# Patient Record
Sex: MALE | Race: BLACK OR AFRICAN AMERICAN
[De-identification: names, ages, dates, MRNs, and addresses within clinical notes are randomized per-mention and may not be internally consistent; named-entity substitution may affect disease eponyms.]

---

## 2017-09-14 ENCOUNTER — PRIOR ORIGINAL RECORDS (OUTPATIENT)
Dept: OTHER | Age: 82
End: 2017-09-14

## 2018-05-25 ENCOUNTER — PRIOR ORIGINAL RECORDS (OUTPATIENT)
Dept: OTHER | Age: 83
End: 2018-05-25

## 2018-10-18 ENCOUNTER — PRIOR ORIGINAL RECORDS (OUTPATIENT)
Dept: OTHER | Age: 83
End: 2018-10-18

## 2018-10-18 ENCOUNTER — MYAURORA ACCOUNT LINK (OUTPATIENT)
Dept: OTHER | Age: 83
End: 2018-10-18

## 2018-10-18 LAB
CHOLESTEROL, TOTAL: 134 MG/DL
HDL CHOLESTEROL: 57 MG/DL
LDL CHOLESTEROL: 68 MG/DL
TRIGLYCERIDES: 44 MG/DL

## 2019-01-01 ENCOUNTER — EXTERNAL RECORD (OUTPATIENT)
Dept: HEALTH INFORMATION MANAGEMENT | Facility: OTHER | Age: 84
End: 2019-01-01

## 2019-02-28 VITALS
WEIGHT: 177 LBS | DIASTOLIC BLOOD PRESSURE: 60 MMHG | HEART RATE: 64 BPM | HEIGHT: 72 IN | BODY MASS INDEX: 23.98 KG/M2 | SYSTOLIC BLOOD PRESSURE: 120 MMHG

## 2019-02-28 VITALS
DIASTOLIC BLOOD PRESSURE: 60 MMHG | OXYGEN SATURATION: 99 % | WEIGHT: 180 LBS | SYSTOLIC BLOOD PRESSURE: 128 MMHG | HEART RATE: 74 BPM | HEIGHT: 72 IN | BODY MASS INDEX: 24.38 KG/M2

## 2019-03-22 RX ORDER — LISINOPRIL 20 MG/1
TABLET ORAL
COMMUNITY

## 2019-03-22 RX ORDER — FENOFIBRATE 54 MG/1
TABLET ORAL
COMMUNITY

## 2019-03-22 RX ORDER — ATENOLOL 25 MG/1
TABLET ORAL
COMMUNITY

## 2019-03-22 RX ORDER — FINASTERIDE 5 MG/1
TABLET, FILM COATED ORAL
COMMUNITY

## 2019-03-22 RX ORDER — ALBUTEROL SULFATE 90 UG/1
AEROSOL, METERED RESPIRATORY (INHALATION)
COMMUNITY

## 2019-03-22 RX ORDER — SIMVASTATIN 20 MG
TABLET ORAL
COMMUNITY

## 2019-03-22 RX ORDER — FERROUS SULFATE 325(65) MG
TABLET ORAL
COMMUNITY

## 2019-03-22 RX ORDER — TAMSULOSIN HYDROCHLORIDE 0.4 MG/1
CAPSULE ORAL
COMMUNITY

## 2019-05-02 ENCOUNTER — OFFICE VISIT (OUTPATIENT)
Dept: CARDIOLOGY | Age: 84
End: 2019-05-02

## 2019-05-02 VITALS
BODY MASS INDEX: 23.86 KG/M2 | DIASTOLIC BLOOD PRESSURE: 60 MMHG | WEIGHT: 180 LBS | HEIGHT: 73 IN | SYSTOLIC BLOOD PRESSURE: 130 MMHG | HEART RATE: 73 BPM | OXYGEN SATURATION: 98 %

## 2019-05-02 DIAGNOSIS — E78.5 DYSLIPIDEMIA: ICD-10-CM

## 2019-05-02 DIAGNOSIS — J43.8 OTHER EMPHYSEMA (CMD): Primary | ICD-10-CM

## 2019-05-02 DIAGNOSIS — I10 ESSENTIAL HYPERTENSION: ICD-10-CM

## 2019-05-02 DIAGNOSIS — E11.9 TYPE 2 DIABETES MELLITUS WITHOUT COMPLICATION, WITHOUT LONG-TERM CURRENT USE OF INSULIN (CMD): ICD-10-CM

## 2019-05-02 DIAGNOSIS — R07.89 OTHER CHEST PAIN: ICD-10-CM

## 2019-05-02 PROBLEM — J44.9 COPD (CHRONIC OBSTRUCTIVE PULMONARY DISEASE) (CMD): Status: ACTIVE | Noted: 2019-05-02

## 2019-05-02 PROCEDURE — 99214 OFFICE O/P EST MOD 30 MIN: CPT | Performed by: INTERNAL MEDICINE

## 2019-05-02 PROCEDURE — 3078F DIAST BP <80 MM HG: CPT | Performed by: INTERNAL MEDICINE

## 2019-05-02 PROCEDURE — 3075F SYST BP GE 130 - 139MM HG: CPT | Performed by: INTERNAL MEDICINE

## 2019-05-02 RX ORDER — PANTOPRAZOLE SODIUM 40 MG/1
40 TABLET, DELAYED RELEASE ORAL
COMMUNITY
Start: 2015-08-27

## 2019-05-23 ENCOUNTER — ANCILLARY PROCEDURE (OUTPATIENT)
Dept: CARDIOLOGY | Age: 84
End: 2019-05-23
Attending: INTERNAL MEDICINE

## 2019-05-23 VITALS — HEIGHT: 73 IN | BODY MASS INDEX: 23.19 KG/M2 | WEIGHT: 175 LBS

## 2019-05-23 DIAGNOSIS — R07.89 OTHER CHEST PAIN: ICD-10-CM

## 2019-05-23 LAB
LV EF: 68 %
RESTING HR ACHIEVED: 60 BPM
STRESS BASELINE BP: NORMAL MMHG
STRESS POST EXERCISE DUR MIN: 1 MIN
STRESS POST EXERCISE DUR SEC: 2 SEC
STRESS TARGET HR: 136 BPM

## 2019-05-23 PROCEDURE — 93015 CV STRESS TEST SUPVJ I&R: CPT | Performed by: INTERNAL MEDICINE

## 2019-05-23 PROCEDURE — 78452 HT MUSCLE IMAGE SPECT MULT: CPT | Performed by: INTERNAL MEDICINE

## 2019-05-23 PROCEDURE — A9502 TC99M TETROFOSMIN: HCPCS | Performed by: INTERNAL MEDICINE

## 2019-05-23 RX ORDER — REGADENOSON 0.08 MG/ML
0.4 INJECTION, SOLUTION INTRAVENOUS ONCE
Status: COMPLETED | OUTPATIENT
Start: 2019-05-23 | End: 2019-05-23

## 2019-05-23 RX ADMIN — REGADENOSON 0.4 MG: 0.08 INJECTION, SOLUTION INTRAVENOUS at 11:30

## 2019-05-23 ASSESSMENT — EXERCISE STRESS TEST
COMMENTS: IMMEDIATE RECOVERY
PEAK_RPP: 8024
STAGE_CATEGORIES: 1
PEAK_HR: 93
PEAK_BP: 140/70
PEAK_RPP: 7560
PEAK_HR: 85
PEAK_RPP: 10710
PEAK_HR: 103
PEAK_RPP: 13390
PEAK_BP: 130/72
STAGE_CATEGORIES: RECOVERY 2
STOPPAGE_REASON: PROTOCOL COMPLETE
PEAK_RPP: 13020
PEAK_BP: 126/66
PEAK_BP: 126/62
PEAK_HR: 68
PEAK_BP: 118/70
STAGE_CATEGORIES: RESTING
COMMENTS: 2 MIN RECOVERY
STAGE_CATEGORIES: RECOVERY 1
STAGE_CATEGORIES: RECOVERY 0
PEAK_HR: 60

## 2019-11-06 ENCOUNTER — OFFICE VISIT (OUTPATIENT)
Dept: CARDIOLOGY | Age: 84
End: 2019-11-06

## 2019-11-06 VITALS
WEIGHT: 183 LBS | HEART RATE: 78 BPM | SYSTOLIC BLOOD PRESSURE: 138 MMHG | HEIGHT: 74 IN | BODY MASS INDEX: 23.49 KG/M2 | OXYGEN SATURATION: 99 % | DIASTOLIC BLOOD PRESSURE: 60 MMHG

## 2019-11-06 DIAGNOSIS — R07.89 OTHER CHEST PAIN: Primary | ICD-10-CM

## 2019-11-06 PROCEDURE — 3078F DIAST BP <80 MM HG: CPT | Performed by: INTERNAL MEDICINE

## 2019-11-06 PROCEDURE — 99214 OFFICE O/P EST MOD 30 MIN: CPT | Performed by: INTERNAL MEDICINE

## 2019-11-06 PROCEDURE — 3075F SYST BP GE 130 - 139MM HG: CPT | Performed by: INTERNAL MEDICINE

## 2019-11-06 ASSESSMENT — PATIENT HEALTH QUESTIONNAIRE - PHQ9
SUM OF ALL RESPONSES TO PHQ9 QUESTIONS 1 AND 2: 0
SUM OF ALL RESPONSES TO PHQ9 QUESTIONS 1 AND 2: 0
2. FEELING DOWN, DEPRESSED OR HOPELESS: NOT AT ALL
1. LITTLE INTEREST OR PLEASURE IN DOING THINGS: NOT AT ALL

## 2020-10-28 ENCOUNTER — HOSPITAL ENCOUNTER (OUTPATIENT)
Dept: CT IMAGING | Facility: HOSPITAL | Age: 85
Discharge: HOME | End: 2020-10-28
Attending: UROLOGY
Payer: MEDICARE

## 2020-10-28 DIAGNOSIS — N28.89 RENAL MASS: ICD-10-CM

## 2020-10-28 DIAGNOSIS — N28.1 SIMPLE RENAL CYST: ICD-10-CM

## 2020-10-28 PROCEDURE — 74176 CT ABD & PELVIS W/O CONTRAST: CPT | Performed by: UROLOGY

## 2020-10-30 ENCOUNTER — APPOINTMENT (OUTPATIENT)
Dept: GENERAL RADIOLOGY | Facility: HOSPITAL | Age: 85
End: 2020-10-30
Attending: EMERGENCY MEDICINE
Payer: MEDICARE

## 2020-10-30 ENCOUNTER — HOSPITAL ENCOUNTER (EMERGENCY)
Facility: HOSPITAL | Age: 85
Discharge: HOME | End: 2020-10-30
Attending: EMERGENCY MEDICINE
Payer: MEDICARE

## 2020-10-30 VITALS
HEIGHT: 62 IN | BODY MASS INDEX: 29.44 KG/M2 | RESPIRATION RATE: 20 BRPM | WEIGHT: 160 LBS | SYSTOLIC BLOOD PRESSURE: 117 MMHG | HEART RATE: 88 BPM | TEMPERATURE: 99 F | OXYGEN SATURATION: 100 % | DIASTOLIC BLOOD PRESSURE: 58 MMHG

## 2020-10-30 DIAGNOSIS — U07.1 COVID-19 VIRUS INFECTION: Primary | ICD-10-CM

## 2020-10-30 DIAGNOSIS — R63.0 DECREASED APPETITE: ICD-10-CM

## 2020-10-30 PROCEDURE — 96374 THER/PROPH/DIAG INJ IV PUSH: CPT

## 2020-10-30 PROCEDURE — 96361 HYDRATE IV INFUSION ADD-ON: CPT

## 2020-10-30 PROCEDURE — 82962 GLUCOSE BLOOD TEST: CPT

## 2020-10-30 PROCEDURE — 99284 EMERGENCY DEPT VISIT MOD MDM: CPT

## 2020-10-30 PROCEDURE — 71045 X-RAY EXAM CHEST 1 VIEW: CPT | Performed by: EMERGENCY MEDICINE

## 2020-10-30 RX ORDER — ONDANSETRON 4 MG/1
4 TABLET, ORALLY DISINTEGRATING ORAL EVERY 8 HOURS PRN
Qty: 30 TABLET | Refills: 0 | Status: SHIPPED | OUTPATIENT
Start: 2020-10-30 | End: 2020-11-06

## 2020-10-30 RX ORDER — ONDANSETRON 2 MG/ML
4 INJECTION INTRAMUSCULAR; INTRAVENOUS ONCE
Status: COMPLETED | OUTPATIENT
Start: 2020-10-30 | End: 2020-10-30

## 2020-10-30 NOTE — ED INITIAL ASSESSMENT (HPI)
Pt presents to ER for symptoms of COVID as pt reported: going on for couple of weeks. + fever,chills. + cough /SOB  + decrease appetite. Per pt his son was tested Positive for COVID Monday. Pt is A/O x 4,breathing is unlabored.   Oxygen saturation at 98

## 2020-10-30 NOTE — ED PROVIDER NOTES
Patient Seen in: [facility] Emergency Department      History   Patient presents with:  Cough/URI  Testing    Stated Complaint: Testing     HPI    80year old male with gerd, dm, htn who presents with cough, malaise, fever/chills, sob, dec appetit Conjunctiva/sclera: Conjunctivae normal.      Pupils: Pupils are equal, round, and reactive to light. Neck:      Musculoskeletal: Full passive range of motion without pain, normal range of motion and neck supple.  No neck rigidity, spinous process tendern Intravenous Given 10/30/20 1511)            Disposition and Plan     Clinical Impression:  [id number] virus infection  (primary encounter diagnosis)  Decreased appetite    Disposition:  Discharge  10/30/2020  4:15 pm    Follow-up:  Osiel Mason  [address] [place] [address]

## 2020-11-02 ENCOUNTER — HOSPITAL ENCOUNTER (EMERGENCY)
Facility: HOSPITAL | Age: 85
Discharge: HOME | End: 2020-11-02
Attending: EMERGENCY MEDICINE
Payer: MEDICARE

## 2020-11-02 ENCOUNTER — APPOINTMENT (OUTPATIENT)
Dept: GENERAL RADIOLOGY | Facility: HOSPITAL | Age: 85
End: 2020-11-02
Attending: EMERGENCY MEDICINE
Payer: MEDICARE

## 2020-11-02 VITALS
BODY MASS INDEX: 30 KG/M2 | HEART RATE: 98 BPM | WEIGHT: 165 LBS | TEMPERATURE: 99 F | OXYGEN SATURATION: 98 % | SYSTOLIC BLOOD PRESSURE: 132 MMHG | RESPIRATION RATE: 16 BRPM | DIASTOLIC BLOOD PRESSURE: 76 MMHG

## 2020-11-02 DIAGNOSIS — U07.1 COVID-19: Primary | ICD-10-CM

## 2020-11-02 DIAGNOSIS — R63.0 ANOREXIA: ICD-10-CM

## 2020-11-02 PROCEDURE — 93010 ELECTROCARDIOGRAM REPORT: CPT | Performed by: EMERGENCY MEDICINE

## 2020-11-02 PROCEDURE — 36415 COLL VENOUS BLD VENIPUNCTURE: CPT

## 2020-11-02 PROCEDURE — 99284 EMERGENCY DEPT VISIT MOD MDM: CPT

## 2020-11-02 PROCEDURE — 71045 X-RAY EXAM CHEST 1 VIEW: CPT | Performed by: EMERGENCY MEDICINE

## 2020-11-02 PROCEDURE — 93005 ELECTROCARDIOGRAM TRACING: CPT

## 2020-11-02 PROCEDURE — 80048 BASIC METABOLIC PNL TOTAL CA: CPT | Performed by: EMERGENCY MEDICINE

## 2020-11-02 PROCEDURE — 85025 COMPLETE CBC W/AUTO DIFF WBC: CPT | Performed by: EMERGENCY MEDICINE

## 2020-11-02 NOTE — ED INITIAL ASSESSMENT (HPI)
Pt presents to ED with worsening COVID symptoms. Unable to specify what is going on today. Pt is alert, oriented and verbal. Whole family is COVID+. Pt diagnosed with COVID here 10/30. Pt denies use of oxygen at home. Denies SOB at this time.  Denies n/v. +

## 2020-11-03 NOTE — CM/SW NOTE
Order for [facility] [address] [facility]     Patient agreeable, called patients home to speak with wife but no answer    Will set up [facility] [address] and notify patient and wife in am

## 2020-11-03 NOTE — ED PROVIDER NOTES
Patient Seen in: [facility] Emergency Department      History   Patient presents with:  Covid    Stated Complaint: COVID+    HPI    59-year-old male with recent COVID diagnosis presents for evaluation.   Patient's family reports patient has had epi regular rhythm. Heart sounds: Normal heart sounds. Pulmonary:      Effort: Pulmonary effort is normal. No respiratory distress. Breath sounds: Normal breath sounds.    Abdominal:      General: Bowel sounds are normal.      Palpations: Abdomen is Sandrine Bueno MD on 11/02/2020 at 4:14 PM                  MDM      Well-appearing patient, labs are within normal limits, patient ambulatory with pulse ox 97 to 98%.   Given pulse ox and incentive spirometer for home, seen by case management to facilitate home

## 2020-11-03 NOTE — CM/SW NOTE
Goyo Antunez from [facility] [place] called stating they are unable to take patient has he has [employer] which they do not accept. Per Goyo Antunez they will re-refer patient and Goyo Antunez will call patient and inform him of the above.  Emmie informed to please p

## 2020-11-03 NOTE — ED NOTES
Dr Perez discussed d/c information with patient and pts daughter.      Case management at bedside and went over pulse oximeter and incentive spirometer use as well as home health    Pt stable and ready for d/c

## 2020-11-03 NOTE — HOME CARE LIAISON
[facility] (formerly known [facility])  is available to take pt. Attempted to call pt at home multiple times and unable to reach or leave  for the pt. CM notified.

## 2020-11-03 NOTE — HOME CARE LIAISON
Received referral from [address] [place] [address]. Unable to accept pt at this time. Pt re-referred. JOSEPHINE Stephens/ Shira updated.

## 2020-11-03 NOTE — ED NOTES
Pt ambulated in room with pulse ox by ERT Maisha    No distress noted.  Pt independently transferring    Pt is speaking in full sentences

## 2020-11-04 NOTE — CM/SW NOTE
Attempted to call patient at home to notify him if [facility] acceptance    No answer, unable to leave message

## 2020-11-05 NOTE — CM/SW NOTE
Rc'd secure message from Shahida Shankar with [facility] they were having problems reaching patient - per [place] line would ring and ring and then does off the hook type of sound.  Sent Emmie message back to please use pt's wife Ashley Lugo yonny

## 2020-11-05 NOTE — CM/SW NOTE
[address] @ [phone] and spoke with Graham Leventhal. Informed Graham Leventhal of wife's contact number to use to reach patient. Graham Leventhal requesting Face to Face, ER MD note and face sheet to be faxed to her.  Faxed 14 pages of the above to Graham Leventhal @ [phone] - qjx

## 2020-11-20 ENCOUNTER — HOSPITAL ENCOUNTER (EMERGENCY)
Facility: HOSPITAL | Age: 85
Discharge: HOME | End: 2020-11-20
Attending: EMERGENCY MEDICINE
Payer: MEDICARE

## 2020-11-20 VITALS
DIASTOLIC BLOOD PRESSURE: 79 MMHG | RESPIRATION RATE: 18 BRPM | OXYGEN SATURATION: 100 % | TEMPERATURE: 99 F | HEART RATE: 87 BPM | SYSTOLIC BLOOD PRESSURE: 158 MMHG

## 2020-11-20 DIAGNOSIS — Z20.822 ENCOUNTER FOR LABORATORY TESTING FOR COVID-19 VIRUS: Primary | ICD-10-CM

## 2020-11-20 PROCEDURE — 99283 EMERGENCY DEPT VISIT LOW MDM: CPT

## 2020-11-20 NOTE — ED INITIAL ASSESSMENT (HPI)
Patient aox3 to ed via private vehicle patient tested positive for covid19 x 3 weeks ago, requesitng another test to see if he is negative.  Patient has no complaints

## 2020-11-20 NOTE — ED PROVIDER NOTES
Patient Seen in: [facility] Emergency Department      History   Patient presents with:  Testing    Stated Complaint: testing     HPI  Patient is an 49-year-old male history of diabetes, hypertension, reflux presenting from home for repeat Covid te Pulmonary:      Effort: Pulmonary effort is normal. No respiratory distress. Breath sounds: Normal breath sounds. Abdominal:      General: There is no distension. Tenderness: There is no abdominal tenderness. There is no guarding or rebound.

## 2021-12-26 ENCOUNTER — APPOINTMENT (OUTPATIENT)
Dept: GENERAL RADIOLOGY | Facility: HOSPITAL | Age: 86
End: 2021-12-26
Attending: NURSE PRACTITIONER
Payer: MEDICARE

## 2021-12-26 ENCOUNTER — HOSPITAL ENCOUNTER (EMERGENCY)
Facility: HOSPITAL | Age: 86
Discharge: HOME | End: 2021-12-26
Payer: MEDICARE

## 2021-12-26 VITALS
BODY MASS INDEX: 30 KG/M2 | HEART RATE: 88 BPM | RESPIRATION RATE: 18 BRPM | TEMPERATURE: 98 F | SYSTOLIC BLOOD PRESSURE: 140 MMHG | DIASTOLIC BLOOD PRESSURE: 79 MMHG | WEIGHT: 164 LBS | OXYGEN SATURATION: 98 %

## 2021-12-26 DIAGNOSIS — J06.9 VIRAL URI WITH COUGH: Primary | ICD-10-CM

## 2021-12-26 LAB
FLUAV + FLUBV RNA SPEC NAA+PROBE: NEGATIVE
FLUAV + FLUBV RNA SPEC NAA+PROBE: NEGATIVE
RSV RNA SPEC NAA+PROBE: NEGATIVE
SARS-COV-2 RNA RESP QL NAA+PROBE: NOT DETECTED

## 2021-12-26 PROCEDURE — 99283 EMERGENCY DEPT VISIT LOW MDM: CPT

## 2021-12-26 PROCEDURE — 71045 X-RAY EXAM CHEST 1 VIEW: CPT | Performed by: NURSE PRACTITIONER

## 2021-12-26 PROCEDURE — 0241U SARS-COV-2/FLU A AND B/RSV BY PCR (GENEXPERT): CPT | Performed by: NURSE PRACTITIONER

## 2021-12-26 NOTE — ED QUICK NOTES
Pt A&OX4, pt denies dizziness/lightheadedmess, cp ,sob, n/v. Discharge paperwork  And follow-up discussed with pt, pt verbally understands them. Pt discharged ambulatory with steady gait - no distress noted.

## 2021-12-26 NOTE — ED PROVIDER NOTES
Patient Seen in: [facility] Emergency Department      History   Patient presents with:  Cough/URI    Stated Complaint: Covid Symptoms    Subjective:   87yo/m with hx of GERD, DM, HTN reports to the ED with complaints of cough, bodyaches x 3 days. Pulmonary:      Effort: Pulmonary effort is normal.      Breath sounds: Normal breath sounds. Abdominal:      General: Bowel sounds are normal.      Palpations: Abdomen is soft. Musculoskeletal:         General: No tenderness or deformity.  Normal ran pleural effusion, or pneumothorax is evident.     BONES: Mild degenerative changes of the thoracic spine are apparent.  There is no fracture or visible bony lesion.                  Impression  CONCLUSION:   Stable chest without radiographically evident acu

## (undated) NOTE — LETTER
Date & Time: 11/6/2020, 6:51 AM  Patient: Xavier Webber    Dear Xavier Webber,    We are unable to reach you by phone and would like to follow up with you regarding test results from your Emergency Department visit.         Please contact the